# Patient Record
Sex: MALE
[De-identification: names, ages, dates, MRNs, and addresses within clinical notes are randomized per-mention and may not be internally consistent; named-entity substitution may affect disease eponyms.]

---
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
## 2024-09-04 ENCOUNTER — RESEARCH ENCOUNTER (OUTPATIENT)
Dept: RESEARCH | Facility: MEDICAL CENTER | Age: 19
End: 2024-09-04

## 2025-05-23 ENCOUNTER — OFFICE VISIT (OUTPATIENT)
Dept: URGENT CARE | Facility: CLINIC | Age: 20
End: 2025-05-23
Payer: COMMERCIAL

## 2025-05-23 VITALS
SYSTOLIC BLOOD PRESSURE: 120 MMHG | TEMPERATURE: 99.2 F | HEIGHT: 75 IN | HEART RATE: 94 BPM | BODY MASS INDEX: 21.24 KG/M2 | DIASTOLIC BLOOD PRESSURE: 74 MMHG | OXYGEN SATURATION: 96 % | RESPIRATION RATE: 19 BRPM | WEIGHT: 170.8 LBS

## 2025-05-23 DIAGNOSIS — J02.0 PHARYNGITIS DUE TO STREPTOCOCCUS SPECIES: Primary | ICD-10-CM

## 2025-05-23 DIAGNOSIS — R68.89 FLU-LIKE SYMPTOMS: ICD-10-CM

## 2025-05-23 LAB — S PYO DNA SPEC NAA+PROBE: DETECTED

## 2025-05-23 PROCEDURE — 3074F SYST BP LT 130 MM HG: CPT

## 2025-05-23 PROCEDURE — 3078F DIAST BP <80 MM HG: CPT

## 2025-05-23 PROCEDURE — 87651 STREP A DNA AMP PROBE: CPT

## 2025-05-23 PROCEDURE — 99203 OFFICE O/P NEW LOW 30 MIN: CPT

## 2025-05-23 RX ORDER — AMOXICILLIN 500 MG/1
500 CAPSULE ORAL 2 TIMES DAILY
Qty: 20 CAPSULE | Refills: 0 | Status: SHIPPED | OUTPATIENT
Start: 2025-05-23 | End: 2025-06-02

## 2025-05-23 ASSESSMENT — ENCOUNTER SYMPTOMS
STRIDOR: 0
PALPITATIONS: 0
DIZZINESS: 0
FOCAL WEAKNESS: 0
ABDOMINAL PAIN: 0
DOUBLE VISION: 0
VOMITING: 0
BRUISES/BLEEDS EASILY: 0
EYE DISCHARGE: 0
PHOTOPHOBIA: 0
EYE PAIN: 0
SPUTUM PRODUCTION: 0
DIAPHORESIS: 0
SORE THROAT: 1
HEMOPTYSIS: 0
COUGH: 0
EYE REDNESS: 0
WHEEZING: 0
DEPRESSION: 0
SHORTNESS OF BREATH: 0
NAUSEA: 0
CHILLS: 1
BLURRED VISION: 0
HEADACHES: 1
MYALGIAS: 1
FEVER: 1
HEARTBURN: 0
DIARRHEA: 0

## 2025-05-23 NOTE — LETTER
May 23, 2025    To Whom It May Concern:         This is confirmation that Garrison Burnham attended his scheduled appointment with MATT Roblero on 5/23/25. They are medically excused from work from 05/21/2025 through 05/24/2025. They may return to work on 05/26/2025 providing they no longer have a fever and their symptoms are improving.            If you have any questions please do not hesitate to call me at the phone number listed below.    Sincerely,          SAHIL Roblero.  [phone]

## 2025-05-24 NOTE — PROGRESS NOTES
Subjective:   Garrison Burnham is a 20 y.o. male who presents for Pharyngitis (2 days ) and Flu Like Symptoms (2 days )          I introduced myself to the patient and informed them that I am a Family Nurse Practitioner.    HPI: Garrison is a 20 year-old male who comes in today c/o tactile fever, chills, pharyngitis, nasal congestion, runny nose, malaise, body aches.  Onset was 3 days ago. Patient describes symptoms as constant. They describe the pain as headache, body aches,  sharp and scratchy throat. Aggravating factors include worse at night, cough is worse when they lay down, throat pain is exacerbated by eating, drinking, swallowing. Relieving factors include hot drinks. Treatments tried at home include  OTC Tylenol, Advil with moderate effect.  They describe their symptoms as moderate. Denies any known exposure to Covid, Flu, RSV, strep. Denies anyone else is sick at home or work presently. States they did not get a flu shot this season, states vaccinated against Covid x 2.  He does endorse mild SOB    Review of Systems   Constitutional:  Positive for chills, fever and malaise/fatigue. Negative for diaphoresis.   HENT:  Positive for congestion and sore throat. Negative for ear discharge, ear pain, hearing loss and tinnitus.    Eyes:  Negative for blurred vision, double vision, photophobia, pain, discharge and redness.   Respiratory:  Negative for cough, hemoptysis, sputum production, shortness of breath, wheezing and stridor.    Cardiovascular:  Negative for chest pain, palpitations and leg swelling.   Gastrointestinal:  Negative for abdominal pain, diarrhea, heartburn, nausea and vomiting.   Genitourinary:  Negative for dysuria.   Musculoskeletal:  Positive for myalgias.   Skin:  Negative for rash.   Neurological:  Positive for headaches. Negative for dizziness and focal weakness.   Endo/Heme/Allergies:  Does not bruise/bleed easily.   Psychiatric/Behavioral:  Negative for depression.    All other systems  "reviewed and are negative.      Medications: sertraline Tabs     Allergies: Patient has no known allergies.    Problem List: does not have a problem list on file.    Surgical History:  No past surgical history on file.    Past Social Hx:   reports that he has never smoked. He has never used smokeless tobacco. He reports that he does not drink alcohol and does not use drugs.     Past Family Hx:   family history is not on file.     Problem list, medications, and allergies reviewed by myself today in Epic.   I have documented what I find to be significant in regards to past medical, social, family and surgical history  in my HPI or under PMH/PSH/FH review section, otherwise it is noncontributory     Objective:     /74   Pulse 94   Temp 37.3 °C (99.2 °F) (Temporal)   Resp 19   Ht 1.905 m (6' 3\")   Wt 77.5 kg (170 lb 12.8 oz)   SpO2 96%   BMI 21.35 kg/m²     During this visit, appropriate PPE was worn, and hand hygiene was performed.    Physical Exam  Vitals reviewed.   Constitutional:       General: He is not in acute distress.     Appearance: Normal appearance. He is not ill-appearing or toxic-appearing.   HENT:      Head: Normocephalic and atraumatic.      Right Ear: Tympanic membrane, ear canal and external ear normal. There is no impacted cerumen.      Left Ear: Tympanic membrane, ear canal and external ear normal. There is no impacted cerumen.      Nose: Congestion present. No rhinorrhea.      Mouth/Throat:      Pharynx: Oropharynx is clear. Posterior oropharyngeal erythema present. No oropharyngeal exudate.      Comments: No tonsillar swelling, bilaterally. There is posterior oropharyngeal erythema present, no exudates or cobblestoning.  No soft tissue swelling of the sublingual mucosa, no petechia or swelling of the soft or hard palate, no unilarteral oropharynx swelling, no sign of tonsillar stone, epiglottitis, or abscess.  Airway is patent and there is no stridor.  Patient is managing oral " secretions appropriately.  Uvula is midline and appropriate size with no erythema or edema.   Eyes:      General: No scleral icterus.        Right eye: No discharge.         Left eye: No discharge.      Conjunctiva/sclera: Conjunctivae normal.      Pupils: Pupils are equal, round, and reactive to light.   Cardiovascular:      Rate and Rhythm: Normal rate and regular rhythm.      Heart sounds: Normal heart sounds. No murmur heard.     No friction rub. No gallop.   Pulmonary:      Effort: Pulmonary effort is normal. No respiratory distress.      Breath sounds: Normal breath sounds. No wheezing, rhonchi or rales.   Abdominal:      General: There is no distension.      Tenderness: There is no abdominal tenderness.   Musculoskeletal:         General: Normal range of motion.      Cervical back: Normal range of motion and neck supple. Tenderness present. No rigidity.      Right lower leg: No edema.      Left lower leg: No edema.   Lymphadenopathy:      Cervical: Cervical adenopathy present.   Skin:     General: Skin is warm and dry.      Coloration: Skin is not jaundiced.   Neurological:      General: No focal deficit present.      Mental Status: He is alert and oriented to person, place, and time. Mental status is at baseline.   Psychiatric:         Mood and Affect: Mood normal.         Behavior: Behavior normal.         Thought Content: Thought content normal.         Judgment: Judgment normal.       Viral panel was negative      Lab Results/POC Test Results   Results for orders placed or performed in visit on 05/23/25   POCT GROUP A STREP, PCR    Collection Time: 05/23/25  6:52 PM   Result Value Ref Range    POC Group A Strep, PCR Detected (A) Not Detected, Invalid           Assessment/Plan:     Diagnosis and associated orders:     1. Pharyngitis due to Streptococcus species  POCT GROUP A STREP, PCR    POCT CoV-2, Flu A/B, RSV by PCR    amoxicillin (AMOXIL) 500 MG Cap      2. Flu-like symptoms  POCT GROUP A STREP, PCR     POCT CoV-2, Flu A/B, RSV by PCR         Comments/MDM:     1. Pharyngitis due to Streptococcus species (Primary)   Strep PCR is positive.  I did notify the patient and discuss strep precautions-   -no sharing of drinks of water bottles, change toothbrush and pillowcases after 48 hours of antibiotics  - generally after 48 hours of antibiotics no longer infectious, may return to work.   -I considered other causes of pharyngitis peritonsillar abscess, ashley's angina, and retropharyngeal abscess but the patient's reported symptoms and my exam do not support these alternative diagnosis based on information I have available today.   -May use Flonase nasal spray to help with the congestion.    -I also advised them to use a humidifier in their room at night to help with sleep, and to gargle with salt water as well as a spoonful of honey as needed to help soothe the sore throat.    - I did print out written information regarding strep pharyngitis and all medications prescribed, and I did go over these with the patient.   -Discussed red flags and reasons to return to urgent care versus when to go to the emergency room  -They state they understand all instructions and and are agreeable with the plan of care.   - POCT GROUP A STREP, PCR  - POCT CoV-2, Flu A/B, RSV by PCR  - amoxicillin (AMOXIL) 500 MG Cap; Take 1 Capsule by mouth 2 times a day for 10 days.  Dispense: 20 Capsule; Refill: 0    2. Flu-like symptoms  Supportive care measures discussed  - POCT GROUP A STREP, PCR  - POCT CoV-2, Flu A/B, RSV by PCR          Pt is clinically stable at today's acute urgent care visit. Vital signs are normal and reassuring.  No acute distress noted. Appropriate for outpatient management at this time.        I personally reviewed prior external notes and test results pertinent to today's visit.  I have independently reviewed and interpreted all diagnostics ordered during this urgent care acute visit.        Please note that this  dictation was created using voice recognition software. I have made a reasonable attempt to correct obvious errors, but I expect that there are errors of grammar and possibly content that I did not discover before finalizing the note.    This note was electronically signed by Camacho MERCER, SELENE, LAURIE, VANDANA